# Patient Record
Sex: FEMALE | Race: WHITE | NOT HISPANIC OR LATINO | Employment: OTHER | URBAN - METROPOLITAN AREA
[De-identification: names, ages, dates, MRNs, and addresses within clinical notes are randomized per-mention and may not be internally consistent; named-entity substitution may affect disease eponyms.]

---

## 2017-08-12 ENCOUNTER — HOSPITAL ENCOUNTER (EMERGENCY)
Facility: HOSPITAL | Age: 72
Discharge: HOME | End: 2017-08-12
Attending: EMERGENCY MEDICINE
Payer: MEDICARE

## 2017-08-12 VITALS
WEIGHT: 156 LBS | RESPIRATION RATE: 18 BRPM | HEIGHT: 65 IN | BODY MASS INDEX: 25.99 KG/M2 | SYSTOLIC BLOOD PRESSURE: 115 MMHG | HEART RATE: 100 BPM | TEMPERATURE: 98 F | OXYGEN SATURATION: 91 % | DIASTOLIC BLOOD PRESSURE: 50 MMHG

## 2017-08-12 DIAGNOSIS — R06.02 SHORTNESS OF BREATH: ICD-10-CM

## 2017-08-12 DIAGNOSIS — J44.1 COPD EXACERBATION: Primary | ICD-10-CM

## 2017-08-12 LAB
ALBUMIN SERPL BCP-MCNC: 3.9 G/DL
ALP SERPL-CCNC: 88 U/L
ALT SERPL W/O P-5'-P-CCNC: 27 U/L
ANION GAP SERPL CALC-SCNC: 13 MMOL/L
AST SERPL-CCNC: 22 U/L
BASOPHILS # BLD AUTO: 0.03 K/UL
BASOPHILS NFR BLD: 0.3 %
BILIRUB SERPL-MCNC: 0.3 MG/DL
BNP SERPL-MCNC: 23 PG/ML
BUN SERPL-MCNC: 18 MG/DL
CALCIUM SERPL-MCNC: 9.3 MG/DL
CHLORIDE SERPL-SCNC: 104 MMOL/L
CO2 SERPL-SCNC: 23 MMOL/L
CREAT SERPL-MCNC: 0.9 MG/DL
DIFFERENTIAL METHOD: ABNORMAL
EOSINOPHIL # BLD AUTO: 1.2 K/UL
EOSINOPHIL NFR BLD: 12.2 %
ERYTHROCYTE [DISTWIDTH] IN BLOOD BY AUTOMATED COUNT: 13.7 %
EST. GFR  (AFRICAN AMERICAN): >60 ML/MIN/1.73 M^2
EST. GFR  (NON AFRICAN AMERICAN): >60 ML/MIN/1.73 M^2
GLUCOSE SERPL-MCNC: 84 MG/DL
HCT VFR BLD AUTO: 39.5 %
HGB BLD-MCNC: 13.5 G/DL
LYMPHOCYTES # BLD AUTO: 2.8 K/UL
LYMPHOCYTES NFR BLD: 28.6 %
MCH RBC QN AUTO: 30.3 PG
MCHC RBC AUTO-ENTMCNC: 34.2 G/DL
MCV RBC AUTO: 89 FL
MONOCYTES # BLD AUTO: 0.8 K/UL
MONOCYTES NFR BLD: 7.8 %
NEUTROPHILS # BLD AUTO: 4.9 K/UL
NEUTROPHILS NFR BLD: 50.8 %
PLATELET # BLD AUTO: 339 K/UL
PMV BLD AUTO: 10.1 FL
POTASSIUM SERPL-SCNC: 4.3 MMOL/L
PROT SERPL-MCNC: 7.5 G/DL
RBC # BLD AUTO: 4.46 M/UL
SODIUM SERPL-SCNC: 140 MMOL/L
TROPONIN I SERPL DL<=0.01 NG/ML-MCNC: 0.01 NG/ML
WBC # BLD AUTO: 9.67 K/UL

## 2017-08-12 PROCEDURE — 94640 AIRWAY INHALATION TREATMENT: CPT

## 2017-08-12 PROCEDURE — 63600175 PHARM REV CODE 636 W HCPCS: Performed by: EMERGENCY MEDICINE

## 2017-08-12 PROCEDURE — 85025 COMPLETE CBC W/AUTO DIFF WBC: CPT

## 2017-08-12 PROCEDURE — 83880 ASSAY OF NATRIURETIC PEPTIDE: CPT

## 2017-08-12 PROCEDURE — 96374 THER/PROPH/DIAG INJ IV PUSH: CPT

## 2017-08-12 PROCEDURE — 25000242 PHARM REV CODE 250 ALT 637 W/ HCPCS: Performed by: EMERGENCY MEDICINE

## 2017-08-12 PROCEDURE — 80053 COMPREHEN METABOLIC PANEL: CPT

## 2017-08-12 PROCEDURE — 84484 ASSAY OF TROPONIN QUANT: CPT

## 2017-08-12 PROCEDURE — 93005 ELECTROCARDIOGRAM TRACING: CPT

## 2017-08-12 PROCEDURE — 99284 EMERGENCY DEPT VISIT MOD MDM: CPT | Mod: 25

## 2017-08-12 RX ORDER — METHYLPREDNISOLONE 4 MG/1
TABLET ORAL
Qty: 1 PACKAGE | Refills: 0 | Status: SHIPPED | OUTPATIENT
Start: 2017-08-12 | End: 2017-09-02

## 2017-08-12 RX ORDER — IPRATROPIUM BROMIDE AND ALBUTEROL SULFATE 2.5; .5 MG/3ML; MG/3ML
3 SOLUTION RESPIRATORY (INHALATION)
Status: COMPLETED | OUTPATIENT
Start: 2017-08-12 | End: 2017-08-12

## 2017-08-12 RX ORDER — IPRATROPIUM BROMIDE AND ALBUTEROL SULFATE 2.5; .5 MG/3ML; MG/3ML
3 SOLUTION RESPIRATORY (INHALATION) EVERY 4 HOURS PRN
Qty: 60 VIAL | Refills: 0 | Status: SHIPPED | OUTPATIENT
Start: 2017-08-12 | End: 2019-03-28

## 2017-08-12 RX ORDER — AZITHROMYCIN 250 MG/1
250 TABLET, FILM COATED ORAL DAILY
Qty: 6 TABLET | Refills: 0 | Status: SHIPPED | OUTPATIENT
Start: 2017-08-12 | End: 2019-03-28 | Stop reason: ALTCHOICE

## 2017-08-12 RX ORDER — METHYLPREDNISOLONE SOD SUCC 125 MG
125 VIAL (EA) INJECTION
Status: COMPLETED | OUTPATIENT
Start: 2017-08-12 | End: 2017-08-12

## 2017-08-12 RX ADMIN — IPRATROPIUM BROMIDE AND ALBUTEROL SULFATE 3 ML: .5; 3 SOLUTION RESPIRATORY (INHALATION) at 07:08

## 2017-08-12 RX ADMIN — IPRATROPIUM BROMIDE AND ALBUTEROL SULFATE 3 ML: .5; 3 SOLUTION RESPIRATORY (INHALATION) at 09:08

## 2017-08-12 RX ADMIN — METHYLPREDNISOLONE SODIUM SUCCINATE 125 MG: 125 INJECTION, POWDER, FOR SOLUTION INTRAMUSCULAR; INTRAVENOUS at 07:08

## 2017-08-12 NOTE — ED NOTES
Pt reports sinus infection that began 5 weeks ago, pt reports tx with antibiotics, pt reports sob has continued from then, reports increased sob on exertion today, pt awake alert in no acute distress, denies chest pain, abd soft non tender to palpate, denies n/v/d, denies abd pain, family at bedside, pt placed on cardiac monitor and bp cuff with pulse ox

## 2017-08-13 NOTE — ED PROVIDER NOTES
Encounter Date: 2017       History     Chief Complaint   Patient presents with    Shortness of Breath     cough for 5 weeks with antibiotics, steriods taken but still getting worse     The patient came to the ED with shortness of breath for the past 4 weeks. She has a history of COPD and has had sinus issues 4 weeks ago, then a cough. She was placed on antibiotics for 1 week.  That was 3 weeks ago.  The patient states she's been on 2 rounds of steroids.  The patient continues to smoke but states she has not been able to smoke for the past 3 days.  She denies fever or shaking chills.  The patient currently has a cough with minimal sputum production.          Review of patient's allergies indicates:   Allergen Reactions    Percodan [oxycodone hcl-oxycodone-asa] Other (See Comments)     Halluacinations     Past Medical History:   Diagnosis Date    Allergy     pt takes shots for enviormental allergies    Anxiety     Arthritis     COPD (chronic obstructive pulmonary disease)     Depression     Environmental allergies     IBS (irritable bowel syndrome)     IBS (irritable bowel syndrome)     Osteoporosis     Varicose veins      Past Surgical History:   Procedure Laterality Date     SECTION      TUBAL LIGATION      VARICOSE VEIN SURGERY       Family History   Problem Relation Age of Onset    Heart disease Mother     Diabetes Father      Social History   Substance Use Topics    Smoking status: Current Every Day Smoker     Packs/day: 1.00     Years: 40.00     Types: Cigarettes    Smokeless tobacco: Never Used    Alcohol use Yes      Comment: occasionally     Review of Systems   Constitutional: Negative for fever.   HENT: Negative for sore throat.    Respiratory: Positive for cough, shortness of breath and wheezing.    Cardiovascular: Negative for chest pain.   Gastrointestinal: Negative for nausea.   Genitourinary: Negative for dysuria.   Musculoskeletal: Negative for back pain.   Skin:  Negative for rash.   Neurological: Negative for weakness.   Hematological: Does not bruise/bleed easily.       Physical Exam     Initial Vitals [08/12/17 1827]   BP Pulse Resp Temp SpO2   139/64 88 20 98.4 °F (36.9 °C) 98 %      MAP       89         Physical Exam    Nursing note and vitals reviewed.  Constitutional: She appears well-developed and well-nourished.   HENT:   Head: Normocephalic and atraumatic.   Mouth/Throat: Oropharynx is clear and moist.   Eyes: Conjunctivae and EOM are normal. Pupils are equal, round, and reactive to light.   Neck: Normal range of motion. Neck supple.   Cardiovascular: Normal rate, regular rhythm, normal heart sounds and intact distal pulses. Exam reveals no gallop and no friction rub.    No murmur heard.  Pulmonary/Chest: Breath sounds normal.   Abdominal: Soft. Bowel sounds are normal. She exhibits no distension. There is no tenderness. There is no rebound and no guarding.   Musculoskeletal: Normal range of motion. She exhibits no edema or tenderness.   Lymphadenopathy:     She has no cervical adenopathy.   Neurological: She is alert and oriented to person, place, and time. She has normal strength and normal reflexes.   Skin: Skin is warm and dry.   Psychiatric: She has a normal mood and affect. Her behavior is normal. Judgment and thought content normal.         ED Course   Procedures  Labs Reviewed   CBC W/ AUTO DIFFERENTIAL - Abnormal; Notable for the following:        Result Value    Eos # 1.2 (*)     Eosinophil% 12.2 (*)     All other components within normal limits   COMPREHENSIVE METABOLIC PANEL   TROPONIN I   B-TYPE NATRIURETIC PEPTIDE     EKG Readings: (Independently Interpreted)   Rhythm: Normal Sinus Rhythm. Heart Rate: 85. Ectopy: No Ectopy. Conduction: Normal. ST Segments: Normal ST Segments. T Waves: Normal. Clinical Impression: Normal Sinus Rhythm          Medical Decision Making:   Clinical Tests:   Lab Tests: Ordered and Reviewed  The following lab test(s) were  unremarkable: CBC, Troponin, BNP and CMP  Radiological Study: Ordered and Reviewed  Medical Tests: Ordered and Reviewed  ED Management:  2140: The patient is feeling better  She still has some expiratory wheezing.  We'll give one more treatment and reassess.  2220: Wheezing has improved, patient will be discharged this time. The patient's daughter is requesting a DuoNeb nebulizer treatment.                   ED Course     Clinical Impression:   The primary encounter diagnosis was COPD exacerbation. A diagnosis of Shortness of breath was also pertinent to this visit.                           Flaquita Arriola MD  08/12/17 9102

## 2018-07-25 DIAGNOSIS — M47.896 OTHER SPONDYLOSIS, LUMBAR REGION: ICD-10-CM

## 2018-07-25 DIAGNOSIS — M47.892 OTHER SPONDYLOSIS, CERVICAL REGION: ICD-10-CM

## 2018-07-25 DIAGNOSIS — M51.37 DEGENERATION OF LUMBAR OR LUMBOSACRAL INTERVERTEBRAL DISC: ICD-10-CM

## 2018-07-25 DIAGNOSIS — M54.50 LUMBAGO: Primary | ICD-10-CM

## 2019-02-14 ENCOUNTER — TELEPHONE (OUTPATIENT)
Dept: VASCULAR SURGERY | Facility: CLINIC | Age: 74
End: 2019-02-14

## 2019-02-14 NOTE — TELEPHONE ENCOUNTER
----- Message from Vilma Menchaca sent at 2/14/2019 10:14 AM CST -----  Contact: pt daughter - Gabriele espinoza   Type:  Sooner Apoointment Request    Caller is requesting a sooner appointment.  Caller declined first available appointment listed below.  Caller will not accept being placed on the waitlist and is requesting a message be sent to doctor.  Name of Caller: Gris   When is the first available appointment? No availability came up  Symptoms: aortic anurysm   Would the patient rather a call back or a response via [facility]? phone  Best Call Back Number: [phone]    Additional Information: being referred Dr Ace in [place]  US was done to diagnose the anursym -     Wants to be seen in [place]

## 2019-03-28 ENCOUNTER — OFFICE VISIT (OUTPATIENT)
Dept: VASCULAR SURGERY | Facility: CLINIC | Age: 74
End: 2019-03-28
Payer: MEDICARE

## 2019-03-28 VITALS
WEIGHT: 179 LBS | BODY MASS INDEX: 29.82 KG/M2 | HEART RATE: 66 BPM | SYSTOLIC BLOOD PRESSURE: 131 MMHG | HEIGHT: 65 IN | DIASTOLIC BLOOD PRESSURE: 78 MMHG

## 2019-03-28 DIAGNOSIS — I71.40 ABDOMINAL AORTIC ANEURYSM (AAA) WITHOUT RUPTURE: Primary | ICD-10-CM

## 2019-03-28 PROCEDURE — 99205 OFFICE O/P NEW HI 60 MIN: CPT | Mod: S$PBB,,, | Performed by: THORACIC SURGERY (CARDIOTHORACIC VASCULAR SURGERY)

## 2019-03-28 PROCEDURE — 99205 PR OFFICE/OUTPT VISIT, NEW, LEVL V, 60-74 MIN: ICD-10-PCS | Mod: S$PBB,,, | Performed by: THORACIC SURGERY (CARDIOTHORACIC VASCULAR SURGERY)

## 2019-03-28 PROCEDURE — 99213 OFFICE O/P EST LOW 20 MIN: CPT | Mod: PBBFAC,PO | Performed by: THORACIC SURGERY (CARDIOTHORACIC VASCULAR SURGERY)

## 2019-03-28 PROCEDURE — 99999 PR PBB SHADOW E&M-EST. PATIENT-LVL III: CPT | Mod: PBBFAC,,, | Performed by: THORACIC SURGERY (CARDIOTHORACIC VASCULAR SURGERY)

## 2019-03-28 PROCEDURE — 99999 PR PBB SHADOW E&M-EST. PATIENT-LVL III: ICD-10-PCS | Mod: PBBFAC,,, | Performed by: THORACIC SURGERY (CARDIOTHORACIC VASCULAR SURGERY)

## 2019-03-28 RX ORDER — MIRTAZAPINE 45 MG/1
45 TABLET, FILM COATED ORAL NIGHTLY
COMMUNITY

## 2019-03-28 RX ORDER — TIZANIDINE 4 MG/1
4 TABLET ORAL
COMMUNITY

## 2019-03-28 RX ORDER — ONDANSETRON HYDROCHLORIDE 8 MG/1
TABLET, FILM COATED ORAL EVERY 8 HOURS PRN
COMMUNITY

## 2019-03-28 RX ORDER — HYDROCODONE BITARTRATE AND ACETAMINOPHEN 10; 325 MG/1; MG/1
1 TABLET ORAL
COMMUNITY

## 2019-03-28 RX ORDER — ATORVASTATIN CALCIUM 10 MG/1
10 TABLET, FILM COATED ORAL DAILY
COMMUNITY

## 2019-03-28 RX ORDER — PANTOPRAZOLE SODIUM 40 MG/1
40 TABLET, DELAYED RELEASE ORAL DAILY
COMMUNITY

## 2019-03-28 NOTE — Clinical Note
March 28, 2019      Mitch Barbosa MD  [address] [address] [address]           [facility]  [address] [facility] [address]  Phone: [phone]          Patient: Shahida Morris   MR Number: [id number]   YOB: 1945   Date of Visit: 3/28/2019       Dear Dr. Mitch Barbosa:    Thank you for referring Shahida Morris to me for evaluation. Attached you will find relevant portions of my assessment and plan of care.    If you have questions, please do not hesitate to call me. I look forward to following Shahida Morris along with you.    Sincerely,    Conchita Christianson MD    Enclosure  CC:  No Recipients    If you would like to receive this communication electronically, please contact [email or web address] or [phone] to request more information on "[employer]" Link access.    For providers and/or their staff who would like to refer a patient to [facility], please contact us through our one-stop-shop provider referral line, [facility], at [phone].    If you feel you have received this communication in error or would no longer like to receive these types of communications, please e-mail [email or web address]

## 2019-03-28 NOTE — PROGRESS NOTES
OFFICE VISIT NOTE    HISTORY OF PRESENT ILLNESS:  I was asked to see this patient by Dr. Barbosa.    The patient is a 73-year-old female whom I was asked to see for an abdominal   aortic aneurysm.  The patient has been having recurrent episodes of severe   abdominal pain.  An ultrasound of the abdomen was done and this showed a small   abdominal aortic aneurysm measuring 3 cm in diameter.  Dr. Barbosa was   concerned that she was having intestinal ischemia and printed up a brochure on   intestinal ischemia and gave it to them, but no study was done to rule this out.    The patient denies any postprandial pain.  She has had about 6 episodes of   severe abdominal pain in the last year.  At one point, she lost weight, but now   she has gained weight.  She used to smoke cigarettes, but has quit smoking.  The   patient also has multiple other complaints.  She states that she has no energy.    She is complaining of pain in the lower extremities after walking short   distances, especially in the left calf, but then she states it radiates up to   the thigh and the groin.  She has no pain in the feet when lying supine.  The   patient has known venous disease of the lower extremities and many years ago   underwent phlebectomy of the left lower extremity.  The patient has no symptoms   that would be consistent with intestinal angina.  The patient also states that   she was given pantoprazole and she has been taking it and ever since then she   has not had another episode of abdominal pain.    PAST MEDICAL HISTORY:  Abdominal aortic aneurysm, chronic obstructive pulmonary   disease, depression, anxiety, arthritis, hyperlipidemia, irritable bowel   syndrome, osteoporosis, varicose veins.    PAST SURGICAL HISTORY:   section, tubal ligation, phlebectomy of the   veins of the left lower extremity.    ALLERGIES:  Percodan, beef containing products, dairied pork containing   products.    MEDICATIONS:  Lipitor, Trelegy  Ellipta, Remeron, Protonix, AccuNeb, Norco,   Zofran, Zanaflex.    FAMILY HISTORY:  Diabetes, coronary artery disease.    SOCIAL HISTORY:  She quit smoking cigarettes on 02/12/2019, but smoked a pack of   cigarettes per day for 40 years.  She drinks alcohol occasionally.    REVISION OF SYSTEMS:  She complains of abdominal pain, which is recurrent and   she has had about six episodes in the last year.  She denies any postprandial   pain.  She is gaining weight.  She has pain of bilateral lower extremities,   worse on the left, which starts in the calf and then radiates proximally after   walking short distances.  She denies any pain in the feet when lying supine.    She has varicose veins and edema of the lower extremities.  She complains of   easy fatigue.  All other systems are reviewed and are negative.    PHYSICAL EXAMINATION:  VITAL SIGNS:  Blood pressure is 131/78, heart rate 66, weight 179 pounds, height   5 feet 5 inches.  GENERAL:  She is awake and alert, in no apparent distress.  HEENT:  Head is normocephalic.  Pupils are equal, round, reactive.  Sclerae are   anicteric.  NECK:  Supple.  Trachea midline.  No masses.  LUNGS:  Clear.  HEART:  Has a regular rate and rhythm.  ABDOMEN:  Soft and nontender.  No masses.  Bowel sounds are present.  EXTREMITIES:  She has scars as long as about 3 cm on the left lower extremity   from previous phlebectomy.  She has varicose veins of bilateral lower   extremities, probably somewhat worse on the left than on the right.  Bilateral   feet are pink and warm with slightly decreased capillary refill.  I cannot   palpate dorsalis pedis nor posterior tibial pulses in either lower extremity.    STUDIES:  Ultrasound of the abdomen showed a 3 cm infrarenal abdominal aortic   aneurysm.    IMPRESSION:  1.  Abdominal aortic aneurysm.  2.  Abdominal pain.  3.  Chronic obstructive pulmonary disease.  4.  Irritable bowel syndrome.  5.  Hyperlipidemia.  6.  Varicose veins.  7.  Pain and  edema of bilateral lower extremities.  8.  Peripheral arterial occlusive disease.    RECOMMENDATIONS:  The patient has a small 3 cm infrarenal abdominal aortic   aneurysm.  No intervention is indicated at this time.  The characteristics of   her abdominal pain do not suggest intestinal ischemia.  She has no intestinal   angina.  She has had six episodes of pain over the last year.  Since she started   taking Protonix, she has not had any abdominal pain.  She does have venous   problems and she has varicose veins with pain and edema.  I think she also has   peripheral arterial occlusive disease.  We will get an ultrasound of her   infrarenal aorta in six months to reevaluate the small aneurysm.  We will do an   ultrasound of her mesenteric arteries to rule out intestinal ischemia.  We will   get noninvasive lower extremity arterial studies and we will get an ultrasound   of her veins to rule out venous insufficiency and see if anything needs to be   done with her veins.  In the meanwhile, she is asked to elevate the legs as much   as possible and to get on a walking exercise program.  Apparently, she was   recently diagnosed with a positive DEVENDRA and is scheduled to see a rheumatologist.    that could be causing some of her complaints.      NATTY  dd: 03/28/2019 09:49:18 (CDT)  td: 03/29/2019 00:54:01 (CDT)  Doc ID   #[id number]  Job ID #[id number]    CC: ANNELIESE CHEUNG M.D.

## 2019-03-29 DIAGNOSIS — M79.604 PAIN IN BOTH LOWER EXTREMITIES: Primary | ICD-10-CM

## 2019-03-29 DIAGNOSIS — M79.605 PAIN IN BOTH LOWER EXTREMITIES: ICD-10-CM

## 2019-03-29 DIAGNOSIS — M79.604 PAIN IN BOTH LOWER EXTREMITIES: ICD-10-CM

## 2019-03-29 DIAGNOSIS — M79.605 PAIN IN BOTH LOWER EXTREMITIES: Primary | ICD-10-CM

## 2019-03-29 DIAGNOSIS — R60.0 LOCALIZED EDEMA: ICD-10-CM

## 2019-03-29 DIAGNOSIS — I71.40 ABDOMINAL AORTIC ANEURYSM (AAA) WITHOUT RUPTURE: Primary | ICD-10-CM

## 2019-04-10 ENCOUNTER — HOSPITAL ENCOUNTER (OUTPATIENT)
Dept: RADIOLOGY | Facility: HOSPITAL | Age: 74
Discharge: HOME | End: 2019-04-10
Attending: THORACIC SURGERY (CARDIOTHORACIC VASCULAR SURGERY)
Payer: MEDICARE

## 2019-04-10 DIAGNOSIS — R60.0 LOCALIZED EDEMA: ICD-10-CM

## 2019-04-10 DIAGNOSIS — I71.40 ABDOMINAL AORTIC ANEURYSM (AAA) WITHOUT RUPTURE: ICD-10-CM

## 2019-04-10 PROCEDURE — 93970 EXTREMITY STUDY: CPT | Mod: TC,PO

## 2019-04-10 PROCEDURE — 93970 US LOWER EXTREMITY VEINS BILATERAL INSUFFICIENCY: ICD-10-PCS | Mod: 26,,, | Performed by: RADIOLOGY

## 2019-04-10 PROCEDURE — 93970 EXTREMITY STUDY: CPT | Mod: 26,,, | Performed by: RADIOLOGY

## 2019-04-12 ENCOUNTER — HOSPITAL ENCOUNTER (OUTPATIENT)
Dept: RADIOLOGY | Facility: HOSPITAL | Age: 74
Discharge: HOME | End: 2019-04-12
Attending: THORACIC SURGERY (CARDIOTHORACIC VASCULAR SURGERY)
Payer: MEDICARE

## 2019-04-12 DIAGNOSIS — I71.40 ABDOMINAL AORTIC ANEURYSM (AAA) WITHOUT RUPTURE: ICD-10-CM

## 2019-04-12 DIAGNOSIS — M79.604 PAIN IN BOTH LOWER EXTREMITIES: ICD-10-CM

## 2019-04-12 DIAGNOSIS — M79.605 PAIN IN BOTH LOWER EXTREMITIES: ICD-10-CM

## 2019-04-12 PROCEDURE — 76775 US EXAM ABDO BACK WALL LIM: CPT | Mod: TC,PO

## 2019-04-12 PROCEDURE — 93976 VASCULAR STUDY: CPT | Mod: TC,PO

## 2019-04-12 PROCEDURE — 93925 LOWER EXTREMITY STUDY: CPT | Mod: TC,PO

## 2019-04-12 PROCEDURE — 93976 VASCULAR STUDY: CPT | Mod: 26,,, | Performed by: RADIOLOGY

## 2019-04-12 PROCEDURE — 93976 US MESENTERIC ISCHEMIA STUDY (XPD): ICD-10-PCS | Mod: 26,,, | Performed by: RADIOLOGY

## 2019-04-12 PROCEDURE — 76775 US EXAM ABDO BACK WALL LIM: CPT | Mod: 26,59,, | Performed by: RADIOLOGY

## 2019-04-12 PROCEDURE — 76775 US MESENTERIC ISCHEMIA STUDY (XPD): ICD-10-PCS | Mod: 26,59,, | Performed by: RADIOLOGY

## 2019-04-12 PROCEDURE — 93925 LOWER EXTREMITY STUDY: CPT | Mod: 26,,, | Performed by: RADIOLOGY

## 2019-04-12 PROCEDURE — 93925 US LOWER EXTREMITY ARTERIES BILATERAL: ICD-10-PCS | Mod: 26,,, | Performed by: RADIOLOGY

## 2019-05-07 ENCOUNTER — TELEPHONE (OUTPATIENT)
Dept: VASCULAR SURGERY | Facility: CLINIC | Age: 74
End: 2019-05-07

## 2019-05-07 DIAGNOSIS — I71.40 ABDOMINAL AORTIC ANEURYSM (AAA) WITHOUT RUPTURE: ICD-10-CM

## 2019-05-07 DIAGNOSIS — I73.9 PAD (PERIPHERAL ARTERY DISEASE): Primary | ICD-10-CM

## 2019-05-07 NOTE — TELEPHONE ENCOUNTER
----- Message from Javed Aguilar sent at 5/7/2019 11:42 AM CDT -----  Type:  Test Results    Who Called:  Self Name of Test (Lab/Mammo/Etc):  Ultrasound of lower extremities   Date of Test:  04/12/2019  Ordering Provider:  Meghan  Where the test was performed:  [facility]  Best Call Back Number:  [phone]  Additional Information:

## 2019-10-02 ENCOUNTER — TELEPHONE (OUTPATIENT)
Dept: VASCULAR SURGERY | Facility: CLINIC | Age: 74
End: 2019-10-02

## 2019-10-02 NOTE — TELEPHONE ENCOUNTER
----- Message from Yves Adamson sent at 10/2/2019  2:04 PM CDT -----  Contact: same  Patient called in and stated she missed a call from office to schedule her test along with her annual checkup for her aneurism.    Patient call back number is [phone]

## 2019-10-02 NOTE — TELEPHONE ENCOUNTER
----- Message from Winsome Aguilar sent at 10/2/2019  1:22 PM CDT -----  Contact: pt  The pt request a call to schedule a f/u appt, the pt can be reached at [phone]///[email or web address]

## 2019-10-02 NOTE — TELEPHONE ENCOUNTER
Recall letter received for 6 month follow up aortic and BLE arterial ultrasounds due in November.  Appointments scheduled, informed once Dr Christianson reviews her results we will contact her with further recommendations.  Voices understanding and is agreeable.

## 2019-11-01 ENCOUNTER — TELEPHONE (OUTPATIENT)
Dept: RADIOLOGY | Facility: HOSPITAL | Age: 74
End: 2019-11-01

## 2021-04-12 NOTE — TELEPHONE ENCOUNTER
LMOR requesting callback to discuss appt for aneurysm.    Pt called with an update on her edema.     Edema to her legs improved with the increase of lasix to 40 mg.   The top of her feet are still swollen.   Her weight today was 160 lbs. This is down 7 lbs from Friday.     Pt did not take her lasix today yet. Please advise

## 2022-11-18 NOTE — TELEPHONE ENCOUNTER
LMOR explaining that results were reviewed by Dr Christianson and he suggested repeating AAA study in six months to re-evaluate, along with lower extremity arterial studies.    17

## 2024-05-22 ENCOUNTER — TELEPHONE (OUTPATIENT)
Dept: GASTROENTEROLOGY | Facility: CLINIC | Age: 79
End: 2024-05-22
Payer: MEDICARE

## 2024-05-22 NOTE — TELEPHONE ENCOUNTER
----- Message from Dashawn Singh sent at 5/22/2024  2:31 PM CDT -----  Regarding: appt access  PATIENT CALL    Pt called to schedule NP appt, diagnosed w/ IBD by an outside provider. Would like to est care and get a 2nd opinion. Please call back at [phone]

## 2024-05-23 ENCOUNTER — TELEPHONE (OUTPATIENT)
Dept: PULMONOLOGY | Facility: CLINIC | Age: 79
End: 2024-05-23
Payer: MEDICARE

## 2024-05-23 NOTE — TELEPHONE ENCOUNTER
----- Message from Nia Newtno MA sent at 5/22/2024  2:43 PM CDT -----  Regarding: FW: appt access    ----- Message -----  From: Dashawn Singh  Sent: 5/22/2024   2:38 PM CDT  To: [facility] Staff  Subject: appt access                                      PATIENT CALL    Pt called to schedule NP appt, known to this provider from another facility and would like to est care at the [place] location. Please call back at [phone]

## 2024-06-18 ENCOUNTER — OFFICE VISIT (OUTPATIENT)
Dept: OPTOMETRY | Facility: CLINIC | Age: 79
End: 2024-06-18
Payer: MEDICARE

## 2024-06-18 DIAGNOSIS — H02.883 MEIBOMIAN GLAND DYSFUNCTION (MGD) OF BOTH EYES: ICD-10-CM

## 2024-06-18 DIAGNOSIS — H16.223 KERATOCONJUNCTIVITIS SICCA NOT SPECIFIED AS SJOGREN'S, BILATERAL: ICD-10-CM

## 2024-06-18 DIAGNOSIS — H02.886 MEIBOMIAN GLAND DYSFUNCTION (MGD) OF BOTH EYES: ICD-10-CM

## 2024-06-18 DIAGNOSIS — H25.13 NUCLEAR SCLEROSIS OF BOTH EYES: Primary | ICD-10-CM

## 2024-06-18 PROCEDURE — 99999 PR PBB SHADOW E&M-EST. PATIENT-LVL II: CPT | Mod: PBBFAC,,, | Performed by: OPTOMETRIST

## 2024-06-18 PROCEDURE — 92004 COMPRE OPH EXAM NEW PT 1/>: CPT | Mod: S$PBB,,, | Performed by: OPTOMETRIST

## 2024-06-18 PROCEDURE — 99212 OFFICE O/P EST SF 10 MIN: CPT | Mod: PBBFAC | Performed by: OPTOMETRIST

## 2024-06-18 RX ORDER — FLUTICASONE PROPIONATE 50 MCG
SPRAY, SUSPENSION (ML) NASAL
COMMUNITY

## 2024-06-18 RX ORDER — KETOTIFEN FUMARATE 0.35 MG/ML
SOLUTION/ DROPS OPHTHALMIC
COMMUNITY
Start: 2023-12-31

## 2024-06-18 RX ORDER — AZITHROMYCIN 250 MG/1
TABLET, FILM COATED ORAL
COMMUNITY

## 2024-06-18 RX ORDER — EZETIMIBE 10 MG/1
10 TABLET ORAL
COMMUNITY
Start: 2024-04-18

## 2024-06-18 RX ORDER — NYSTATIN 100000 [USP'U]/ML
5 SUSPENSION ORAL 4 TIMES DAILY
COMMUNITY
Start: 2024-01-04

## 2024-06-18 RX ORDER — CYCLOSPORINE 0.5 MG/ML
1 EMULSION OPHTHALMIC EVERY 12 HOURS
Qty: 5.5 ML | Refills: 11 | Status: SHIPPED | OUTPATIENT
Start: 2024-06-18 | End: 2025-06-18

## 2024-06-18 RX ORDER — SULFAMETHOXAZOLE AND TRIMETHOPRIM 800; 160 MG/1; MG/1
1 TABLET ORAL 2 TIMES DAILY
COMMUNITY
Start: 2024-04-22

## 2024-06-18 RX ORDER — LIFITEGRAST 50 MG/ML
SOLUTION/ DROPS OPHTHALMIC
Status: CANCELLED | OUTPATIENT
Start: 2024-06-18

## 2024-06-18 RX ORDER — CEFDINIR 300 MG/1
300 CAPSULE ORAL EVERY 12 HOURS
COMMUNITY
Start: 2023-12-31

## 2024-06-18 NOTE — PROGRESS NOTES
HPI    Pt is here today for routine eye exam. Patient states that no drops offer   her relief from dryness.   DLS: 5+ Years ago  (-)Flashes   (-)Floaters   (+)Diplopia   (-)Headaches   (+)Itching   (-)Tearing  (+)Burning  (+)Dryness: States that it is progressively getting worse  (+)Photophobia  (+)Glare   (-)Blurred VA  Past Eye Sx: (-)  Eye Meds: OTC Dryness Relief OU PRN    Last edited by Nohemi Portillo, OD on 6/18/2024  3:12 PM.            Assessment /Plan     For exam results, see Encounter Report.    Nuclear sclerosis of both eyes    Keratoconjunctivitis sicca not specified as Sjogren's, bilateral  -     cycloSPORINE (RESTASIS MULTIDOSE) 0.05 % Drop; Apply 1 drop to eye every 12 (twelve) hours.  Dispense: 5.5 mL; Refill: 11    Meibomian gland dysfunction (MGD) of both eyes      1. Educated pt on findings. Not visually significant. No need for removal at this time. Monitor yearly.    Continue use of OTC reading glasses prn. Monitor yearly.      2. Educated pt on chronic nature of condition and need for long term therapy for best maintenance. Rx Restasis bid OU. Pt states she has had inadequate relief of symptoms with every brand OTC dry eye medication.    3. Educated pt on findings. Recommend Reid Mask ~10 min Qday. AT prn.   If symptoms worsen or dont improve, RTC. Monitor.      RTC in 1 year for annual eye exam unless changes noted sooner.

## 2024-08-01 ENCOUNTER — TELEPHONE (OUTPATIENT)
Dept: GASTROENTEROLOGY | Facility: CLINIC | Age: 79
End: 2024-08-01
Payer: MEDICARE

## 2024-08-01 NOTE — TELEPHONE ENCOUNTER
----- Message from Francessheldon Tiana sent at 8/1/2024  3:13 PM CDT -----  Regarding: Appt  Contact: [phone]  Type:  Sooner Apoointment Request    Caller is requesting a sooner appointment.  Caller declined first available appointment listed below.  Caller will not accept being placed on the waitlist and is requesting a message be sent to doctor.  Name of Caller:Patient  When is the first available appointment?)8/20/2024  Symptoms:patient pain is getting worst  Would the patient rather a call back or a response via [facility]? Call Back  Best Call Back Number:[phone]   Additional Information:

## 2024-08-20 ENCOUNTER — OFFICE VISIT (OUTPATIENT)
Dept: GASTROENTEROLOGY | Facility: CLINIC | Age: 79
End: 2024-08-20
Payer: MEDICARE

## 2024-08-20 ENCOUNTER — TELEPHONE (OUTPATIENT)
Dept: GASTROENTEROLOGY | Facility: CLINIC | Age: 79
End: 2024-08-20

## 2024-08-20 ENCOUNTER — LAB VISIT (OUTPATIENT)
Dept: LAB | Facility: HOSPITAL | Age: 79
End: 2024-08-20
Attending: INTERNAL MEDICINE
Payer: MEDICARE

## 2024-08-20 VITALS
BODY MASS INDEX: 28.17 KG/M2 | HEART RATE: 77 BPM | SYSTOLIC BLOOD PRESSURE: 121 MMHG | WEIGHT: 169.06 LBS | DIASTOLIC BLOOD PRESSURE: 80 MMHG | HEIGHT: 65 IN

## 2024-08-20 DIAGNOSIS — K56.609 INTESTINAL OBSTRUCTION, UNSPECIFIED CAUSE, UNSPECIFIED WHETHER PARTIAL OR COMPLETE: Primary | ICD-10-CM

## 2024-08-20 DIAGNOSIS — K56.609 INTESTINAL OBSTRUCTION, UNSPECIFIED CAUSE, UNSPECIFIED WHETHER PARTIAL OR COMPLETE: ICD-10-CM

## 2024-08-20 DIAGNOSIS — R19.7 DIARRHEA, UNSPECIFIED TYPE: ICD-10-CM

## 2024-08-20 PROCEDURE — 3079F DIAST BP 80-89 MM HG: CPT | Mod: CPTII,S$GLB,, | Performed by: INTERNAL MEDICINE

## 2024-08-20 PROCEDURE — 1101F PT FALLS ASSESS-DOCD LE1/YR: CPT | Mod: CPTII,S$GLB,, | Performed by: INTERNAL MEDICINE

## 2024-08-20 PROCEDURE — 1159F MED LIST DOCD IN RCRD: CPT | Mod: CPTII,S$GLB,, | Performed by: INTERNAL MEDICINE

## 2024-08-20 PROCEDURE — 3074F SYST BP LT 130 MM HG: CPT | Mod: CPTII,S$GLB,, | Performed by: INTERNAL MEDICINE

## 2024-08-20 PROCEDURE — 80053 COMPREHEN METABOLIC PANEL: CPT | Performed by: INTERNAL MEDICINE

## 2024-08-20 PROCEDURE — 99999 PR PBB SHADOW E&M-EST. PATIENT-LVL III: CPT | Mod: PBBFAC,,, | Performed by: INTERNAL MEDICINE

## 2024-08-20 PROCEDURE — 99204 OFFICE O/P NEW MOD 45 MIN: CPT | Mod: S$GLB,,, | Performed by: INTERNAL MEDICINE

## 2024-08-20 PROCEDURE — 36415 COLL VENOUS BLD VENIPUNCTURE: CPT | Performed by: INTERNAL MEDICINE

## 2024-08-20 PROCEDURE — 3288F FALL RISK ASSESSMENT DOCD: CPT | Mod: CPTII,S$GLB,, | Performed by: INTERNAL MEDICINE

## 2024-08-20 PROCEDURE — 1125F AMNT PAIN NOTED PAIN PRSNT: CPT | Mod: CPTII,S$GLB,, | Performed by: INTERNAL MEDICINE

## 2024-08-20 PROCEDURE — 83690 ASSAY OF LIPASE: CPT | Performed by: INTERNAL MEDICINE

## 2024-08-20 NOTE — TELEPHONE ENCOUNTER
----- Message from Shayy Vargas sent at 8/20/2024  4:33 PM CDT -----  Pt calling in regards to [facility] Imaging needing a code , diagnostic code and order with Dr's     signature as soon as possible     Confirmed patient's contact info below:  Contact Name: Shahida Morris  Phone Number: [phone]

## 2024-08-20 NOTE — PROGRESS NOTES
Reason for visit: GI issues    HPI:  is a 79 year old lady with IBS. Takes Pantoprazole, Sucralfate and Dicyclomine with good control of symptoms. For the past 6 weeks has been having nausea upon waking. It gets better with nausea medication. After 30 minutes of a meal would feel urge to have a BM with loose explosive stool.  Recently took antibiotics in July for respiratory infection. Since the course of antibiotics she has noted these symptoms. Has been on probiotics as well. No nocturnal diarrhea. Appetite is diminished. Underwent EGD and colonoscopy within the past 5 years.    Medical history includes Chronic obstructive pulmonary disease, Spondylosis of lumbosacral region, Cervical spondylosis, Secondary fibromyalgia, HLP, Anxiety, Seasonal allergies and h/o laparoscopic cholecystectomy (Sept 2023). After cholecystectomy felt better with GI symptoms and did well for about 9 months. Denies any dysphagia, or odynophagia. Has some heartburn and acid regurgitation. No unintentional weight loss. No melena or maroon stools. No recent changes in diet or medications. No family history of IBD, Celiac disease or GI malignancy. No regular NSAIDs, alcohol or tobacco use. No recent antibiotic use, travels or sick contacts. No prior history of C.diff.    Past medical, surgical, social and family history reviewed in epic    Medication allergies reviewed in epic    Review of systems:    Constitutional:  No fever, no chills, no weight loss, appetite is normal  Eyes:  No visual changes or red eyes  ENT:  No odynophagia or hoarseness of voice  Cardiovascular:  No angina or palpitation  Respiratory:  No shortness breath or wheezing  Genitourinary:  No dysuria or frequency  Musculoskeletal:  No myalgias or arthralgias  Skin:  No pruritus or eczema  Neurologic:  No headache or seizures  Psychiatric:  No anxiety depression  Gastrointestinal:  See HPI    Physical exam:  Vitals see epic, awake, alert, oriented x3 in no  acute distress    Neck:  Supple, no carotid bruit, no cervical adenopathy  Abdomen:  Obese, soft, mild periumbilical tender, slightly distended, no masses palpable, no hepatosplenomegaly detected, bowel sounds are normal, no ascites clinically detectable  Eyes:  Conjunctivae anicteric, not injected  ENT:  Oral mucosa moist  Cardiovascular:  S1, S2 normal, no murmurs, no gallops, no abdominal bruits heard  Respiratory:  Bilateral air entry equal, no rhonchi, no crackles, normal effort  Skin:  No palmar erythema or spider angiomata  Neurologic:  No asterixis or tremors  Psychiatric:  Affect appropriate, proper judgment, proper insight, oriented to place and time  Lower extremities:  No pedal edema    No recent labs, imaging and endoscopy reports reviewed.      Impression: Recent diarrhea with lower abdominal pain- ?bowel obstruction    Recommendations:     Clostridium difficile  CT abdomen and pelvis with po and iv contrast  Continue dicyclomine  Florastor 1 capsule daily after the above

## 2024-08-21 LAB
ALBUMIN SERPL BCP-MCNC: 4 G/DL (ref 3.5–5.2)
ALP SERPL-CCNC: 78 U/L (ref 55–135)
ALT SERPL W/O P-5'-P-CCNC: 41 U/L (ref 10–44)
ANION GAP SERPL CALC-SCNC: 10 MMOL/L (ref 8–16)
AST SERPL-CCNC: 43 U/L (ref 10–40)
BILIRUB SERPL-MCNC: 0.3 MG/DL (ref 0.1–1)
BUN SERPL-MCNC: 11 MG/DL (ref 8–23)
CALCIUM SERPL-MCNC: 9.1 MG/DL (ref 8.7–10.5)
CHLORIDE SERPL-SCNC: 103 MMOL/L (ref 95–110)
CO2 SERPL-SCNC: 24 MMOL/L (ref 23–29)
CREAT SERPL-MCNC: 1.3 MG/DL (ref 0.5–1.4)
EST. GFR  (NO RACE VARIABLE): 42.4 ML/MIN/1.73 M^2
GLUCOSE SERPL-MCNC: 93 MG/DL (ref 70–110)
LIPASE SERPL-CCNC: 19 U/L (ref 4–60)
POTASSIUM SERPL-SCNC: 4.3 MMOL/L (ref 3.5–5.1)
PROT SERPL-MCNC: 7.4 G/DL (ref 6–8.4)
SODIUM SERPL-SCNC: 137 MMOL/L (ref 136–145)

## 2024-08-22 ENCOUNTER — HOSPITAL ENCOUNTER (OUTPATIENT)
Dept: RADIOLOGY | Facility: HOSPITAL | Age: 79
Discharge: HOME | End: 2024-08-22
Attending: INTERNAL MEDICINE
Payer: MEDICARE

## 2024-08-22 ENCOUNTER — TELEPHONE (OUTPATIENT)
Dept: GASTROENTEROLOGY | Facility: CLINIC | Age: 79
End: 2024-08-22
Payer: MEDICARE

## 2024-08-22 DIAGNOSIS — K56.609 INTESTINAL OBSTRUCTION, UNSPECIFIED CAUSE, UNSPECIFIED WHETHER PARTIAL OR COMPLETE: ICD-10-CM

## 2024-08-22 PROCEDURE — A9698 NON-RAD CONTRAST MATERIALNOC: HCPCS | Performed by: INTERNAL MEDICINE

## 2024-08-22 PROCEDURE — 74177 CT ABD & PELVIS W/CONTRAST: CPT | Mod: 26,,, | Performed by: RADIOLOGY

## 2024-08-22 PROCEDURE — 25500020 PHARM REV CODE 255: Performed by: INTERNAL MEDICINE

## 2024-08-22 PROCEDURE — 74177 CT ABD & PELVIS W/CONTRAST: CPT | Mod: TC

## 2024-08-22 RX ADMIN — IOHEXOL 75 ML: 350 INJECTION, SOLUTION INTRAVENOUS at 08:08

## 2024-08-22 RX ADMIN — BARIUM SULFATE 450 ML: 20 SUSPENSION ORAL at 08:08

## 2024-08-22 NOTE — TELEPHONE ENCOUNTER
----- Message from Therese Salamanca sent at 8/22/2024 12:25 PM CDT -----  Regarding: Insurance is needing diagnostic  Contact: [phone]  Type:  Needs Medical Advice    Who Called: keke Pinedo called stating she is scheduled for a CT scan and her insurance is needing more information sent diagnosis    Would the patient rather a call back or a response via [facility]? both  Best Call Back Number: [phone]    Additional Information: Fax [phone]

## 2024-08-23 ENCOUNTER — TELEPHONE (OUTPATIENT)
Dept: GASTROENTEROLOGY | Facility: CLINIC | Age: 79
End: 2024-08-23
Payer: MEDICARE

## 2024-08-23 NOTE — TELEPHONE ENCOUNTER
Dr Castellon, this patient is asking if you could comment on the CT results. They are already in the system.

## 2024-08-23 NOTE — PROGRESS NOTES
Spoke with  and discussed CT scan report. Constipation appears to be the main issue and she felt better after  the CT scan when she had a few good BMs. She will start Florastor 1 cap daily and use Senokot S 1-2 tabs after supper nightly. Follow up next available.

## 2024-08-23 NOTE — TELEPHONE ENCOUNTER
MA Spoke to patient, explained there are no appt available, patient agreed to be put in waiting list.

## 2024-08-23 NOTE — TELEPHONE ENCOUNTER
----- Message from Jf Castellon MD sent at 8/23/2024  4:19 PM CDT -----  Please schedule follow up next available.

## 2024-09-18 ENCOUNTER — TELEPHONE (OUTPATIENT)
Dept: GASTROENTEROLOGY | Facility: CLINIC | Age: 79
End: 2024-09-18
Payer: MEDICARE

## 2024-09-18 NOTE — TELEPHONE ENCOUNTER
----- Message from Jf Castellon MD sent at 9/18/2024  8:47 AM CDT -----  Please schedule a follow up visit, next available.  ----- Message -----  From: Noemy Napier MA  Sent: 9/18/2024   8:21 AM CDT  To: Jf Castellon MD    Hi, would you like for me to schedule a follow up appointment with this patient ?  ----- Message -----  From: Shelby Robles  Sent: 9/17/2024  11:57 AM CDT  To: Ravinder SMITH Staff    Type:  Appointment Request     Name of Caller:JOSEFA JAMES [[id number]]  When is the first available appointment?No access  Symptoms:continuing constipation and diarrhea  Would the patient rather a call back or a response via [facility]? call  Best Call Back Number:[phone]   Additional Information: The patient is wanting an appointment with the provider and does not want to wait a long time. They state they never received a call about a follow up appointment with the provider. Please give the patient a call back as soon as possible to schedule.

## 2024-09-18 NOTE — TELEPHONE ENCOUNTER
MA spoke to patient, Patient stated that she will see another doctor, because the appointment date that was offered to the patient by the Ma patient said that was to far out, patient said Thank you and hung the phone up.